# Patient Record
Sex: FEMALE | Race: WHITE | Employment: OTHER | URBAN - METROPOLITAN AREA
[De-identification: names, ages, dates, MRNs, and addresses within clinical notes are randomized per-mention and may not be internally consistent; named-entity substitution may affect disease eponyms.]

---

## 2017-03-02 ENCOUNTER — HOSPITAL ENCOUNTER (OUTPATIENT)
Dept: MRI IMAGING | Age: 77
Discharge: HOME | End: 2017-03-02
Attending: PSYCHIATRY & NEUROLOGY | Admitting: PSYCHIATRY & NEUROLOGY

## 2017-03-02 DIAGNOSIS — R93.0 ABNORMAL FINDINGS ON DIAGNOSTIC IMAGING OF SKULL AND HEAD, NOT ELSEWHERE CLASSIFIED: ICD-10-CM

## 2017-03-02 DIAGNOSIS — R93.0 ABNORMAL MRI OF THE HEAD: ICD-10-CM

## 2017-03-03 ENCOUNTER — OFFICE VISIT (OUTPATIENT)
Dept: INTERNAL MEDICINE CLINIC | Age: 77
End: 2017-03-03

## 2017-03-03 VITALS
DIASTOLIC BLOOD PRESSURE: 70 MMHG | HEART RATE: 90 BPM | BODY MASS INDEX: 26.54 KG/M2 | OXYGEN SATURATION: 97 % | SYSTOLIC BLOOD PRESSURE: 128 MMHG | WEIGHT: 144.2 LBS | HEIGHT: 62 IN

## 2017-03-03 DIAGNOSIS — Z79.4 TYPE 2 DIABETES MELLITUS WITHOUT COMPLICATION, WITH LONG-TERM CURRENT USE OF INSULIN (HCC): ICD-10-CM

## 2017-03-03 DIAGNOSIS — E11.9 TYPE 2 DIABETES MELLITUS WITHOUT COMPLICATION, WITH LONG-TERM CURRENT USE OF INSULIN (HCC): ICD-10-CM

## 2017-03-03 DIAGNOSIS — I10 ESSENTIAL HYPERTENSION: ICD-10-CM

## 2017-03-03 DIAGNOSIS — N39.0 RECURRENT UTI: Primary | ICD-10-CM

## 2017-03-03 LAB
BILIRUBIN, POC: NORMAL
BLOOD URINE, POC: NORMAL
CLARITY, POC: NORMAL
COLOR, POC: NORMAL
GLUCOSE URINE, POC: NORMAL
KETONES, POC: NORMAL
LEUKOCYTE EST, POC: NORMAL
NITRITE, POC: NORMAL
PH, POC: 6.5
PROTEIN, POC: NORMAL
SPECIFIC GRAVITY, POC: 1.01
UROBILINOGEN, POC: 0.2

## 2017-03-03 PROCEDURE — 81002 URINALYSIS NONAUTO W/O SCOPE: CPT | Performed by: INTERNAL MEDICINE

## 2017-03-03 PROCEDURE — 99214 OFFICE O/P EST MOD 30 MIN: CPT | Performed by: INTERNAL MEDICINE

## 2017-03-03 RX ORDER — ESOMEPRAZOLE MAGNESIUM 40 MG/1
40 CAPSULE, DELAYED RELEASE ORAL 2 TIMES DAILY
Qty: 180 CAPSULE | Refills: 3 | Status: SHIPPED | OUTPATIENT
Start: 2017-03-03

## 2017-03-03 RX ORDER — SIMVASTATIN 40 MG
40 TABLET ORAL EVERY EVENING
Qty: 90 TABLET | Refills: 3 | Status: SHIPPED | OUTPATIENT
Start: 2017-03-03 | End: 2017-05-02 | Stop reason: SDUPTHER

## 2017-03-03 RX ORDER — LISINOPRIL AND HYDROCHLOROTHIAZIDE 12.5; 1 MG/1; MG/1
TABLET ORAL
Qty: 90 TABLET | Refills: 2 | Status: SHIPPED | OUTPATIENT
Start: 2017-03-03 | End: 2017-05-02

## 2017-03-03 RX ORDER — NITROFURANTOIN 25; 75 MG/1; MG/1
CAPSULE ORAL
Qty: 42 CAPSULE | Refills: 0 | Status: SHIPPED | OUTPATIENT
Start: 2017-03-03

## 2017-03-03 RX ORDER — POTASSIUM CHLORIDE 750 MG/1
TABLET, FILM COATED, EXTENDED RELEASE ORAL
Qty: 180 TABLET | Refills: 3 | Status: SHIPPED | OUTPATIENT
Start: 2017-03-03

## 2017-03-03 RX ORDER — METOPROLOL SUCCINATE 50 MG/1
TABLET, EXTENDED RELEASE ORAL
Qty: 270 TABLET | Refills: 3 | Status: SHIPPED | OUTPATIENT
Start: 2017-03-03

## 2017-03-03 RX ORDER — MONTELUKAST SODIUM 10 MG/1
10 TABLET ORAL DAILY
Qty: 90 TABLET | Refills: 3 | Status: SHIPPED | OUTPATIENT
Start: 2017-03-03 | End: 2018-04-20 | Stop reason: SDUPTHER

## 2017-03-03 RX ORDER — LEVOTHYROXINE SODIUM 0.1 MG/1
TABLET ORAL
Qty: 90 TABLET | Refills: 3 | Status: SHIPPED | OUTPATIENT
Start: 2017-03-03

## 2017-03-03 RX ORDER — LORAZEPAM 2 MG/1
TABLET ORAL
Qty: 90 TABLET | Refills: 3 | Status: SHIPPED | OUTPATIENT
Start: 2017-03-03

## 2017-03-03 ASSESSMENT — ENCOUNTER SYMPTOMS: BACK PAIN: 0

## 2017-03-05 LAB
ORGANISM: ABNORMAL
URINE CULTURE, ROUTINE: ABNORMAL

## 2017-03-06 ENCOUNTER — HOSPITAL ENCOUNTER (OUTPATIENT)
Dept: GENERAL RADIOLOGY | Age: 77
Discharge: HOME | End: 2017-03-06
Attending: INTERNAL MEDICINE | Admitting: INTERNAL MEDICINE

## 2017-03-06 DIAGNOSIS — R93.0 ABNORMAL FINDINGS ON DIAGNOSTIC IMAGING OF SKULL AND HEAD, NOT ELSEWHERE CLASSIFIED: ICD-10-CM

## 2017-03-06 DIAGNOSIS — R13.10 DYSPHAGIA, UNSPECIFIED TYPE: ICD-10-CM

## 2017-03-22 ENCOUNTER — TELEPHONE (OUTPATIENT)
Dept: INTERNAL MEDICINE CLINIC | Age: 77
End: 2017-03-22

## 2017-04-17 ENCOUNTER — HOSPITAL ENCOUNTER (OUTPATIENT)
Dept: WOMENS IMAGING | Age: 77
Discharge: HOME | End: 2017-04-17

## 2017-04-17 DIAGNOSIS — Z12.31 VISIT FOR SCREENING MAMMOGRAM: ICD-10-CM

## 2017-05-02 ENCOUNTER — OFFICE VISIT (OUTPATIENT)
Dept: INTERNAL MEDICINE CLINIC | Age: 77
End: 2017-05-02

## 2017-05-02 VITALS
WEIGHT: 140.6 LBS | SYSTOLIC BLOOD PRESSURE: 138 MMHG | DIASTOLIC BLOOD PRESSURE: 70 MMHG | HEART RATE: 83 BPM | BODY MASS INDEX: 25.88 KG/M2 | HEIGHT: 62 IN | OXYGEN SATURATION: 97 %

## 2017-05-02 DIAGNOSIS — E11.9 TYPE 2 DIABETES MELLITUS WITHOUT COMPLICATION, WITH LONG-TERM CURRENT USE OF INSULIN (HCC): ICD-10-CM

## 2017-05-02 DIAGNOSIS — Z79.4 TYPE 2 DIABETES MELLITUS WITHOUT COMPLICATION, WITH LONG-TERM CURRENT USE OF INSULIN (HCC): ICD-10-CM

## 2017-05-02 DIAGNOSIS — R63.4 WEIGHT LOSS: Primary | ICD-10-CM

## 2017-05-02 DIAGNOSIS — R13.10 DYSPHAGIA, UNSPECIFIED TYPE: ICD-10-CM

## 2017-05-02 DIAGNOSIS — R63.4 WEIGHT LOSS: ICD-10-CM

## 2017-05-02 LAB
A/G RATIO: 1.8 (ref 1.1–2.2)
ALBUMIN SERPL-MCNC: 4.6 G/DL (ref 3.4–5)
ALP BLD-CCNC: 38 U/L (ref 40–129)
ALT SERPL-CCNC: 29 U/L (ref 10–40)
ANION GAP SERPL CALCULATED.3IONS-SCNC: 20 MMOL/L (ref 3–16)
AST SERPL-CCNC: 30 U/L (ref 15–37)
BASOPHILS ABSOLUTE: 0.1 K/UL (ref 0–0.2)
BASOPHILS RELATIVE PERCENT: 0.9 %
BILIRUB SERPL-MCNC: 0.3 MG/DL (ref 0–1)
BUN BLDV-MCNC: 14 MG/DL (ref 7–20)
CALCIUM SERPL-MCNC: 9.7 MG/DL (ref 8.3–10.6)
CHLORIDE BLD-SCNC: 98 MMOL/L (ref 99–110)
CO2: 24 MMOL/L (ref 21–32)
CREAT SERPL-MCNC: 0.5 MG/DL (ref 0.6–1.2)
EOSINOPHILS ABSOLUTE: 0.3 K/UL (ref 0–0.6)
EOSINOPHILS RELATIVE PERCENT: 2.6 %
GFR AFRICAN AMERICAN: >60
GFR NON-AFRICAN AMERICAN: >60
GLOBULIN: 2.6 G/DL
GLUCOSE BLD-MCNC: 82 MG/DL (ref 70–99)
HCT VFR BLD CALC: 41 % (ref 36–48)
HEMOGLOBIN: 14.1 G/DL (ref 12–16)
LYMPHOCYTES ABSOLUTE: 4 K/UL (ref 1–5.1)
LYMPHOCYTES RELATIVE PERCENT: 36.2 %
MCH RBC QN AUTO: 33.2 PG (ref 26–34)
MCHC RBC AUTO-ENTMCNC: 34.5 G/DL (ref 31–36)
MCV RBC AUTO: 96.4 FL (ref 80–100)
MONOCYTES ABSOLUTE: 1.1 K/UL (ref 0–1.3)
MONOCYTES RELATIVE PERCENT: 9.7 %
NEUTROPHILS ABSOLUTE: 5.6 K/UL (ref 1.7–7.7)
NEUTROPHILS RELATIVE PERCENT: 50.6 %
PDW BLD-RTO: 12.2 % (ref 12.4–15.4)
PLATELET # BLD: 299 K/UL (ref 135–450)
PMV BLD AUTO: 9.1 FL (ref 5–10.5)
POTASSIUM SERPL-SCNC: 3.7 MMOL/L (ref 3.5–5.1)
RBC # BLD: 4.25 M/UL (ref 4–5.2)
SEDIMENTATION RATE, ERYTHROCYTE: 16 MM/HR (ref 0–30)
SODIUM BLD-SCNC: 142 MMOL/L (ref 136–145)
TOTAL PROTEIN: 7.2 G/DL (ref 6.4–8.2)
TSH SERPL DL<=0.05 MIU/L-ACNC: 0.89 UIU/ML (ref 0.27–4.2)
WBC # BLD: 11.1 K/UL (ref 4–11)

## 2017-05-02 PROCEDURE — 99214 OFFICE O/P EST MOD 30 MIN: CPT | Performed by: INTERNAL MEDICINE

## 2017-05-02 RX ORDER — HYDROCHLOROTHIAZIDE 12.5 MG/1
CAPSULE, GELATIN COATED ORAL
Qty: 30 CAPSULE | Refills: 3 | Status: SHIPPED | OUTPATIENT
Start: 2017-05-02

## 2017-05-02 RX ORDER — SIMVASTATIN 40 MG
40 TABLET ORAL EVERY EVENING
Qty: 90 TABLET | Refills: 3 | Status: SHIPPED | OUTPATIENT
Start: 2017-05-02

## 2017-05-02 ASSESSMENT — ENCOUNTER SYMPTOMS: COUGH: 1

## 2017-06-09 ENCOUNTER — OFFICE VISIT (OUTPATIENT)
Dept: INTERNAL MEDICINE CLINIC | Age: 77
End: 2017-06-09

## 2017-06-09 VITALS
HEART RATE: 72 BPM | SYSTOLIC BLOOD PRESSURE: 120 MMHG | DIASTOLIC BLOOD PRESSURE: 76 MMHG | TEMPERATURE: 98.2 F | HEIGHT: 62 IN | OXYGEN SATURATION: 97 % | WEIGHT: 141.6 LBS | BODY MASS INDEX: 26.06 KG/M2

## 2017-06-09 DIAGNOSIS — R13.10 SWALLOWING PROBLEM: ICD-10-CM

## 2017-06-09 DIAGNOSIS — E11.9 TYPE 2 DIABETES MELLITUS WITHOUT COMPLICATION, WITH LONG-TERM CURRENT USE OF INSULIN (HCC): Primary | ICD-10-CM

## 2017-06-09 DIAGNOSIS — Z79.4 TYPE 2 DIABETES MELLITUS WITHOUT COMPLICATION, WITH LONG-TERM CURRENT USE OF INSULIN (HCC): Primary | ICD-10-CM

## 2017-06-09 DIAGNOSIS — I10 ESSENTIAL HYPERTENSION: ICD-10-CM

## 2017-06-09 PROCEDURE — G8510 SCR DEP NEG, NO PLAN REQD: HCPCS | Performed by: INTERNAL MEDICINE

## 2017-06-09 PROCEDURE — 99213 OFFICE O/P EST LOW 20 MIN: CPT | Performed by: INTERNAL MEDICINE

## 2017-06-09 ASSESSMENT — PATIENT HEALTH QUESTIONNAIRE - PHQ9
1. LITTLE INTEREST OR PLEASURE IN DOING THINGS: 1
2. FEELING DOWN, DEPRESSED OR HOPELESS: 1
SUM OF ALL RESPONSES TO PHQ QUESTIONS 1-9: 2
SUM OF ALL RESPONSES TO PHQ9 QUESTIONS 1 & 2: 2

## 2018-04-20 RX ORDER — MONTELUKAST SODIUM 10 MG/1
TABLET ORAL
Qty: 90 TABLET | Refills: 1 | Status: SHIPPED | OUTPATIENT
Start: 2018-04-20

## 2024-03-19 ENCOUNTER — HOSPITAL ENCOUNTER (OUTPATIENT)
Dept: OTHER | Facility: HOSPITAL | Age: 84
Discharge: HOME | End: 2024-03-19

## 2024-10-07 ENCOUNTER — HOSPITAL ENCOUNTER (OUTPATIENT)
Dept: OTHER | Facility: HOSPITAL | Age: 84
Discharge: HOME | End: 2024-10-07

## 2024-10-23 ENCOUNTER — OFFICE VISIT (OUTPATIENT)
Dept: NEUROSURGERY | Facility: CLINIC | Age: 84
End: 2024-10-23
Payer: MEDICARE

## 2024-10-23 VITALS
BODY MASS INDEX: 24.11 KG/M2 | HEIGHT: 62 IN | HEART RATE: 72 BPM | SYSTOLIC BLOOD PRESSURE: 117 MMHG | WEIGHT: 131 LBS | DIASTOLIC BLOOD PRESSURE: 77 MMHG

## 2024-10-23 DIAGNOSIS — M48.061 SPINAL STENOSIS, LUMBAR REGION, WITHOUT NEUROGENIC CLAUDICATION: ICD-10-CM

## 2024-10-23 DIAGNOSIS — M54.6 PAIN IN THORACIC SPINE: Primary | ICD-10-CM

## 2024-10-23 DIAGNOSIS — M54.14 THORACIC RADICULOPATHY: ICD-10-CM

## 2024-10-23 RX ORDER — METOPROLOL SUCCINATE 50 MG/1
TABLET, EXTENDED RELEASE ORAL
COMMUNITY

## 2024-10-23 RX ORDER — SIMVASTATIN 20 MG
TABLET ORAL
COMMUNITY
Start: 2024-06-25

## 2024-10-23 RX ORDER — AMLODIPINE BESYLATE 2.5 MG/1
TABLET ORAL
COMMUNITY
Start: 2024-06-25

## 2024-10-23 RX ORDER — OMEPRAZOLE 40 MG/1
CAPSULE, DELAYED RELEASE ORAL
COMMUNITY
Start: 2024-07-31

## 2024-10-23 RX ORDER — MAGNESIUM OXIDE 400 MG/1
1 TABLET ORAL EVERY 12 HOURS SCHEDULED
COMMUNITY
Start: 2024-08-01

## 2024-10-23 RX ORDER — LEVOTHYROXINE SODIUM 50 UG/1
TABLET ORAL
COMMUNITY
Start: 2024-07-31

## 2024-10-23 RX ORDER — LISINOPRIL/HYDROCHLOROTHIAZIDE 10-12.5 MG
TABLET ORAL
COMMUNITY
Start: 2024-06-25

## 2024-10-23 NOTE — PROGRESS NOTES
Chief Complaint  low back pain     Subjective          Velia Zavaleta who is a 84 y.o. year old female who presents to [facility] for evaluation of lumbar spine.    History of Present Illness  The patient is an 84-year-old female presenting for low back and right hip pain. She is accompanied by an adult male.    She reports experiencing low back pain that extends into her right leg, with the most severe pain localized in her hip. The pain is not constant and seems to be triggered by certain movements or activities. She describes the back pain as similar to sciatica. She does not frequently experience pain radiating down her leg, but she does have knee pain, which she attributes to arthritis. She denies any pain along her spine but reports pain in the rib region. She has not had any imaging done for this.    She has been dealing with hip pain for over two years and has been receiving injections for it. She is under the care of an orthopedic specialist for her hip issues. She has received an injection in her spine, which did not provide significant relief as her spine was not the primary source of discomfort. Following this, she received a hip injection, which also did not alleviate her symptoms. She has had an MRI of her hip and is scheduled for a follow-up with her orthopedic specialist. She has been diagnosed with bursitis in her hip. There was discussion previously about a hip replacement by a prior Orthopedic surgeon.     She occasionally feels as if her hips are going to give way but reports no weakness or numbness in her legs. She experiences tingling in her feet, which she attributes to her diabetes. She reports no recent falls. She tires easily after walking long distances due to constant twisting of her body. She is more concerned with pain in the thoracic spine region, which radiates into the right ribs. The pain will be so severe that she cries out. She feels  "that she is compensating her movement with the thoracic spine due to her right hip.     History of Previous Spinal Surgery?: No    Nicotine use: non-smoker    BMI: Body mass index is 23.96 kg/m².      Review of Systems   Musculoskeletal:  Positive for arthralgias, back pain, gait problem, myalgias and bursitis.   Neurological:  Positive for numbness.   All other systems reviewed and are negative.       Objective   Vital Signs:   /77 (BP Location: Left arm, Patient Position: Sitting)   Pulse 72   Ht 157.5 cm (62\")   Wt 59.4 kg (131 lb)   BMI 23.96 kg/m²       Physical Exam  Vitals reviewed.   Constitutional:       Appearance: Normal appearance.   Musculoskeletal:      Thoracic back: Tenderness present.      Lumbar back: No tenderness. Negative right straight leg raise test and negative left straight leg raise test.      Right hip: Tenderness present. Decreased range of motion.      Left hip: No tenderness. Normal range of motion.   Neurological:      Mental Status: She is alert.      Motor: Motor strength is normal.     Deep Tendon Reflexes:      Reflex Scores:       Patellar reflexes are 0 on the right side and 0 on the left side.       Achilles reflexes are 0 on the right side and 0 on the left side.       Neurological Exam  Mental Status  Alert.    Motor   Strength is 5/5 throughout all four extremities.    Sensory  Sensation is intact to light touch, pinprick, vibration and proprioception in all four extremities.    Reflexes                                            Right                      Left  Patellar                                0                         0  Achilles                                0                         0    Gait  Casual gait: Limp on the right.      Physical Exam         Result Review :       Data reviewed : Radiologic studies MRI Lumbar Spine on 3/19/24 at [facility] personally reviewed and interpreted. Multilevel degenerative changes. At L4/5 there is moderately " severe spinal stenosis. This is the most notable finding.            Assessment and Plan    Diagnoses and all orders for this visit:    1. Pain in thoracic spine (Primary)  -     MRI Thoracic Spine Without Contrast; Future    2. Thoracic radiculopathy  -     MRI Thoracic Spine Without Contrast; Future    3. Spinal stenosis, lumbar region, without neurogenic claudication        Assessment & Plan  1. Low back pain radiating into the right leg.  Arthritic changes in the lumbar spine could be contributing to her discomfort. However, the symptoms described do not seem to originate from the lower back. The MRI of the lumbar spine shows degeneration of the disks at multiple levels, with a moderately severe disc bulge and spinal stenosis at the L4-5 level. Despite these findings, the pain in the hip is not related to the back condition. A thoracic spine MRI will be ordered to ensure there is no nerve impingement or compression in that area.    2. Hip pain.  The patient has been experiencing hip pain for at least 2 years and has been receiving injections without significant relief. She is advised to follow up with Dr. Fox regarding the hip MRI to determine the next steps for her hip joint. If the injections are not helping, she should discuss the possibility of a hip replacement with Dr. Fox.    3. Rib pain.  The patient reports pain in the rib region, which has not been previously imaged. A thoracic spine MRI will be ordered to ensure there is no underlying issue in the upper back that could be contributing to the rib pain.      Follow-up  Return after the thoracic spine MRI to discuss the results and formulate the next course of action.         Follow Up   Return in about 6 weeks (around 12/4/2024).  Patient was given instructions and counseling regarding her condition or for health maintenance advice.     Patient or patient representative verbalized consent for the use of Ambient Listening during the visit with   JHONNY Saab for chart documentation. 10/23/2024  15:03 EDT    -MRI Thoracic Spine  -Follow up after MRI

## 2024-11-13 ENCOUNTER — HOSPITAL ENCOUNTER (OUTPATIENT)
Dept: OTHER | Facility: HOSPITAL | Age: 84
Discharge: HOME | End: 2024-11-13

## 2024-11-14 DIAGNOSIS — M54.6 PAIN IN THORACIC SPINE: ICD-10-CM

## 2024-11-14 DIAGNOSIS — M54.14 THORACIC RADICULOPATHY: ICD-10-CM

## 2024-12-04 ENCOUNTER — OFFICE VISIT (OUTPATIENT)
Dept: NEUROSURGERY | Facility: CLINIC | Age: 84
End: 2024-12-04
Payer: MEDICARE

## 2024-12-04 VITALS
DIASTOLIC BLOOD PRESSURE: 77 MMHG | WEIGHT: 130.7 LBS | SYSTOLIC BLOOD PRESSURE: 121 MMHG | HEIGHT: 62 IN | HEART RATE: 73 BPM | BODY MASS INDEX: 24.05 KG/M2

## 2024-12-04 DIAGNOSIS — M48.061 SPINAL STENOSIS, LUMBAR REGION, WITHOUT NEUROGENIC CLAUDICATION: ICD-10-CM

## 2024-12-04 DIAGNOSIS — M51.34 DEGENERATIVE DISC DISEASE, THORACIC: Primary | ICD-10-CM

## 2024-12-04 RX ORDER — SEMAGLUTIDE 1.34 MG/ML
INJECTION, SOLUTION SUBCUTANEOUS
COMMUNITY
Start: 2024-10-21

## 2024-12-04 RX ORDER — POTASSIUM CHLORIDE 750 MG/1
1 CAPSULE, EXTENDED RELEASE ORAL EVERY 12 HOURS SCHEDULED
COMMUNITY
Start: 2024-11-22

## 2024-12-04 NOTE — PROGRESS NOTES
Chief Complaint  Back Pain    Subjective          Velia Zavaleta who is a 84 y.o. year old female who presents to [facility] for follow up. MRI Thoracic Spine.     History of Present Illness  The patient is an 84-year-old female following up for MRI of thoracic spine.    She has undergone an MRI of her thoracic spine due to pain radiating into her right rib, which has since improved. The MRI Thoracic Spine was overall unremarkable. She has not yet followed up with the Orthopedist for her hip. She experiences pain in her right hip and buttock, as well as the knee. She denies radicular pain into her leg.       Interval History 10/23/24    The patient is an 84-year-old female presenting for low back and right hip pain. She is accompanied by an adult male.     She reports experiencing low back pain that extends into her right leg, with the most severe pain localized in her hip. The pain is not constant and seems to be triggered by certain movements or activities. She describes the back pain as similar to sciatica. She does not frequently experience pain radiating down her leg, but she does have knee pain, which she attributes to arthritis. She denies any pain along her spine but reports pain in the rib region. She has not had any imaging done for this.     She has been dealing with hip pain for over two years and has been receiving injections for it. She is under the care of an orthopedic specialist for her hip issues. She has received an injection in her spine, which did not provide significant relief as her spine was not the primary source of discomfort. Following this, she received a hip injection, which also did not alleviate her symptoms. She has had an MRI of her hip and is scheduled for a follow-up with her orthopedic specialist. She has been diagnosed with bursitis in her hip. There was discussion previously about a hip replacement by a prior Orthopedic surgeon.     "  She occasionally feels as if her hips are going to give way but reports no weakness or numbness in her legs. She experiences tingling in her feet, which she attributes to her diabetes. She reports no recent falls. She tires easily after walking long distances due to constant twisting of her body. She is more concerned with pain in the thoracic spine region, which radiates into the right ribs. The pain will be so severe that she cries out. She feels that she is compensating her movement with the thoracic spine due to her right hip.     History of Previous Spinal Surgery?: No     Nicotine use: non-smoker     BMI: Body mass index is 23.96 kg/m².    Recent Interventions: See above      Review of Systems   Musculoskeletal:  Positive for arthralgias, back pain and bursitis.   All other systems reviewed and are negative.       Objective   Vital Signs:   /77 (BP Location: Left arm, Patient Position: Sitting)   Pulse 73   Ht 157.5 cm (62\")   Wt 59.3 kg (130 lb 11.2 oz)   BMI 23.91 kg/m²       Physical Exam  Vitals reviewed.   Constitutional:       Appearance: Normal appearance.   Musculoskeletal:      Thoracic back: Tenderness present.      Lumbar back: No tenderness. Negative right straight leg raise test and negative left straight leg raise test.      Right hip: Tenderness present. Decreased range of motion.      Left hip: No tenderness. Normal range of motion.   Neurological:      Mental Status: She is alert.      Deep Tendon Reflexes:      Reflex Scores:       Patellar reflexes are 0 on the right side and 0 on the left side.       Achilles reflexes are 0 on the right side and 0 on the left side.       Neurological Exam  Mental Status  Alert.    Reflexes                                            Right                      Left  Patellar                                0                         0  Achilles                                0                         0      Physical Exam         Result Review : "       Data reviewed : Radiologic studies MRI Lumbar Spine on 3/19/24 at [facility] personally reviewed and interpreted. Multilevel degenerative changes. At L4/5 there is moderately severe spinal stenosis. This is the most notable finding.           MRI Thoracic Spine on 11/13/24 at [facility] personally reviewed and interpreted. Disc degeneration without significant spinal canal or foraminal stenosis. Overall unremarkable.      Assessment and Plan    Diagnoses and all orders for this visit:    1. Degenerative disc disease, thoracic (Primary)    2. Spinal stenosis, lumbar region, without neurogenic claudication        Assessment & Plan  1. Thoracic spine pain.  The MRI results do not indicate any significant degenerative changes or pinched nerves in the thoracic spine. There is some disk degeneration and kyphosis in the upper thoracic spine region. The pain has resolved on its own, which is reassuring.     2. Lumbar spine pain.  The patient has arthritic changes in the back which could contribute to back pain. We discussed that surgery would not improve the back pain. If pain radiates down the leg to the foot, a follow-up is advised.    3. Hip pain.  The patient is advised to follow up with the orthopedist regarding the hip pain. Potential options such as joint injections and hip replacement should be discussed with the orthopedist.            Follow Up   Return if symptoms worsen or fail to improve.  Patient was given instructions and counseling regarding her condition or for health maintenance advice.     Patient or patient representative verbalized consent for the use of Ambient Listening during the visit with  JHONNY Saab for chart documentation. 12/4/2024  14:54 EST